# Patient Record
Sex: FEMALE | Race: WHITE | NOT HISPANIC OR LATINO | Employment: PART TIME | URBAN - METROPOLITAN AREA
[De-identification: names, ages, dates, MRNs, and addresses within clinical notes are randomized per-mention and may not be internally consistent; named-entity substitution may affect disease eponyms.]

---

## 2017-11-30 ENCOUNTER — OFFICE VISIT (OUTPATIENT)
Dept: OBSTETRICS AND GYNECOLOGY | Facility: CLINIC | Age: 52
End: 2017-11-30

## 2017-11-30 VITALS
BODY MASS INDEX: 25.78 KG/M2 | WEIGHT: 151 LBS | HEART RATE: 88 BPM | HEIGHT: 64 IN | DIASTOLIC BLOOD PRESSURE: 55 MMHG | SYSTOLIC BLOOD PRESSURE: 94 MMHG

## 2017-11-30 DIAGNOSIS — Z11.3 SCREEN FOR STD (SEXUALLY TRANSMITTED DISEASE): ICD-10-CM

## 2017-11-30 DIAGNOSIS — Z11.4 SCREENING FOR HIV (HUMAN IMMUNODEFICIENCY VIRUS): ICD-10-CM

## 2017-11-30 DIAGNOSIS — N90.89 VULVAR LESION: Primary | ICD-10-CM

## 2017-11-30 DIAGNOSIS — Z12.4 SCREENING FOR CERVICAL CANCER: ICD-10-CM

## 2017-11-30 PROCEDURE — 99203 OFFICE O/P NEW LOW 30 MIN: CPT | Performed by: OBSTETRICS & GYNECOLOGY

## 2017-11-30 NOTE — PROGRESS NOTES
Subjective   Jayda Miles is a 51 y.o. female   CC: Pt here for vaginal lesion and would like std testing.  History of Present Illness  Pt here for vaginal lesion and would like std testing.  Patient reports that she has a remote history of genital warts.  She reported that she had not had any outbreaks for about 15-20 years.  Recently she had noticed some bumps around the opening of the vagina which she thought were warts, and she went into her primary care physician to be evaluated.  She reports that her primary care physician noted genital warts as well as a white lesion at the opening of the vagina that she thought need to be evaluated by a gynecologist.  The patient reports that in September she and her boyfriend had a full STD panel and both of them were negative.  Patient would like to have repeat STD testing again today, because she is concerned that she may have been exposed to an additional STD by her boyfriend.  Patient reports a light menses.  Occasionally she will miss her menstrual period.  She is also having symptoms of hot flashes and night sweats at times.  Patient reports is been several years since she had a Pap smear.  She had a mammogram in over a year ago, and she is aware of the need to have a mammogram done this year.  She has never had a colonoscopy performed.  She is a smoker.      OB History    Para Term  AB Living   2 2 2   2   SAB TAB Ectopic Multiple Live Births             # Outcome Date GA Lbr Rusty/2nd Weight Sex Delivery Anes PTL Lv   2 Term      Vag-Spont      1 Term      Vag-Spont           History reviewed. No pertinent past medical history.     Past Surgical History:   Procedure Laterality Date   • BREAST BIOPSY     • BREAST LUMPECTOMY     • TUBAL ABDOMINAL LIGATION       Family History   Problem Relation Age of Onset   • Cancer Father    • Uterine cancer Mother      Social History   Substance Use Topics   • Smoking status: Current Every Day Smoker   • Smokeless  "tobacco: Never Used   • Alcohol use No     No current outpatient prescriptions on file.     No Known Allergies    Review of Systems  General: No fever or chills  Constitutional: No weight loss or gain, no hair loss  HENT: No headache, no hearing loss, no tinnitus  Eyes: normal vision, no eye pain  Lungs: No cough, no shortness of breath  Heart: No chest pain, no palpitations  Abdomen: No nausea, vomiting, constipation or diarrhea  : No dysuria, no hematuria  Skin: No rashes  Lymph: No swelling  Neuro: No parathesia, no weakness  Psych: Normal though content, no hallucinations, no SI/HI    Objective   Physical Exam  Vitals:    11/30/17 1124   BP: 94/55   Pulse: 88   Weight: 151 lb (68.5 kg)   Height: 64\" (162.6 cm)   Patient's last menstrual period was 11/01/2017 (approximate).   Gen: No acute distress, awake and oriented times three  Abdomen: soft, nontender, non distended, normoactive bowel sounds  Pelvic:   Normal external female genitalia, there is a roughly 2-3 mm white raised lesion at the 5 oclock position at the vaginal introitus. Nontender. No drainage. Possibly leukoplakia. No other condylomatous appearing lesions identified.  Vagina: Moderate frothy white/yellow DC noted  Cervix: No cervical motion tenderness, no lesions, cervix friable, some slightly bleeding after Pap  Bimanual Deferred  Psych: Good judgement and insight, normal affect and mood    Wet prep: Possible trichomonads seen, but specimen difficult to interpret due to excess saline, pos clue cells      Assessment/Plan   Diagnoses and all orders for this visit:    Vulvar lesion  -     HSV 1 & 2 - Specific Antibody, IgG  -     Herpes Simplex Virus (HSV) 1 & 2, JULIA - ThinPrep Vial, Cervix    Screen for STD (sexually transmitted disease)  -     HIV-1 / O / 2 Ag / Antibody 4th Generation  -     Hepatitis B Surface Antigen  -     Hepatitis C Antibody  -     RPR  -     HSV 1 & 2 - Specific Antibody, IgG  -     Chlamydia trachomatis, Neisseria " gonorrhoeae, Trichomonas vaginalis, PCR - Swab, Vagina  -     Herpes Simplex Virus (HSV) 1 & 2, JULIA - ThinPrep Vial, Cervix    Screening for HIV (human immunodeficiency virus)  -     HIV-1 / O / 2 Ag / Antibody 4th Generation    Screening for cervical cancer  -     IGP, Apt HPV,rfx 16 / 18,45 - ThinPrep Vial, Cervix    No significant condyloma noted on exam today.  She does have a small white lesion at 5 o'clock position around the introitus.  This is suspicious and should be biopsied.  Differential would include condylomatous disease, herpetic lesion, a vulvar/vaginal dermatosis, vulvar intraepithelial neoplasia, vulvar malignancy.  Definitive diagnosis will come after the biopsy.  I discussed the importance of following this up with the patient.  She verbalized understanding.  She should return to the office in 1 week to have this biopsy performed.  On exam today, there are some findings suspicious for Trichomonas.  Patient is a frothy yellow discharge and some cervical irritation.  Wet prep was inconclusive for trichomonal disease today.  We will confirm this with cultures which have been sent.  Repeat STD blood work was performed today as well.  I've also obtained HSV PCR from the white lesion on the vulva and ordered HSV antibodies.  We will notify the patient of the results as receive them.  I've instructed the patient to call our office if she has not heard the results after 1 week.  We'll plan to see her back in 1 week as well for vulvar biopsy.    I spent 20 out of 30 minutes with the patient in face to face counseling of the above issues.

## 2017-12-01 LAB
HBV SURFACE AG SERPL QL IA: NEGATIVE
HCV AB S/CO SERPL IA: <0.1 S/CO RATIO (ref 0–0.9)
HIV 1+2 AB+HIV1 P24 AG SERPL QL IA: NON REACTIVE
HSV1 IGG SER IA-ACNC: 49.6 INDEX (ref 0–0.9)
HSV2 IGG SER IA-ACNC: <0.91 INDEX (ref 0–0.9)
RPR SER QL: NORMAL

## 2017-12-03 LAB
HSV1 DNA SPEC QL NAA+PROBE: NEGATIVE
HSV2 DNA SPEC QL NAA+PROBE: NEGATIVE

## 2017-12-04 ENCOUNTER — TELEPHONE (OUTPATIENT)
Dept: OBSTETRICS AND GYNECOLOGY | Facility: CLINIC | Age: 52
End: 2017-12-04

## 2017-12-04 LAB
C TRACH RRNA SPEC QL NAA+PROBE: NEGATIVE
N GONORRHOEA RRNA SPEC QL NAA+PROBE: NEGATIVE
T VAGINALIS RRNA SPEC QL NAA+PROBE: NEGATIVE

## 2017-12-04 NOTE — TELEPHONE ENCOUNTER
----- Message from Talib Paniagua MD sent at 12/1/2017  9:23 AM EST -----  Notify the patient that her STD blood work was normal, but I am still waiting for the Pap smear results, culture results, and herpes culture results.  Her herpes type I antibodies were positive, but this does not likely represent a genital herpes infection.  This is more likely secondary to cold sores/fever blisters, etc.

## 2017-12-05 ENCOUNTER — TELEPHONE (OUTPATIENT)
Dept: OBSTETRICS AND GYNECOLOGY | Facility: CLINIC | Age: 52
End: 2017-12-05

## 2017-12-05 LAB
CYTOLOGIST CVX/VAG CYTO: NORMAL
CYTOLOGY CVX/VAG DOC THIN PREP: NORMAL
DX ICD CODE: NORMAL
HIV 1 & 2 AB SER-IMP: NORMAL
HPV I/H RISK 4 DNA CVX QL PROBE+SIG AMP: NEGATIVE
OTHER STN SPEC: NORMAL
PATH REPORT.FINAL DX SPEC: NORMAL
STAT OF ADQ CVX/VAG CYTO-IMP: NORMAL

## 2017-12-05 NOTE — TELEPHONE ENCOUNTER
----- Message from Talib Paniagua MD sent at 12/4/2017  3:34 PM EST -----  Notify the patient that her vaginal cultures for gonorrhea, chlamydia, and herpes were all negative.

## 2017-12-06 ENCOUNTER — TELEPHONE (OUTPATIENT)
Dept: OBSTETRICS AND GYNECOLOGY | Facility: CLINIC | Age: 52
End: 2017-12-06

## 2017-12-06 NOTE — TELEPHONE ENCOUNTER
----- Message from Talib Paniagua MD sent at 12/5/2017  4:27 PM EST -----  Notify the patient that her Pap was normal

## 2017-12-08 ENCOUNTER — PROCEDURE VISIT (OUTPATIENT)
Dept: OBSTETRICS AND GYNECOLOGY | Facility: CLINIC | Age: 52
End: 2017-12-08

## 2017-12-08 VITALS
BODY MASS INDEX: 25.44 KG/M2 | DIASTOLIC BLOOD PRESSURE: 72 MMHG | SYSTOLIC BLOOD PRESSURE: 116 MMHG | HEIGHT: 64 IN | WEIGHT: 149 LBS | HEART RATE: 89 BPM

## 2017-12-08 DIAGNOSIS — N90.89 VULVAR LESION: Primary | ICD-10-CM

## 2017-12-08 PROCEDURE — 56605 BIOPSY OF VULVA/PERINEUM: CPT | Performed by: OBSTETRICS & GYNECOLOGY

## 2017-12-08 NOTE — PROGRESS NOTES
Procedure   Procedures   pt here for vulvar bx.      Procedure: Vulvar biopsy    Indications: Vulvar lesion    Findings: About a 3 mm rounded lesion at the 5 o'clock position just inside the left labium minora, the lesion is a white plaquing-appearing lesion.  No other lesions identified during a thorough external vaginal/vulvar/perineal exam.  Patient is very concerned about potential condylomatous lesions.  Aside from the lesion noted, I do not see any other signs of condylomatous disease.  The area that I feel she is most concerned about appears to just be the lower aspect of her labium minora, which appears normal.    Pathology: Left lower biopsy    Estimated blood loss: Minimal    Procedure in detail: The patient was counseled about the indications for the procedure.  The risks, benefits, and alternatives were discussed with the patient at length including the risks of bleeding, infection, pain, wound breakdown.  She verbalized understanding and gave verbal consent to proceed.  Betadine was applied to the area to be biopsied.  About 2 cc of 1% lidocaine with epinephrine was injected just underneath the skin.  A 3.5 mm punch biopsy was obtained from the right labium minora and the area of the most abnormal appearance.  The disc was then elevated with forceps and excised.  This was sent to pathology.  Pressure was held with sterile gauze and silver nitrate was applied to the biopsy site.  Excellent hemostasis was noted.  There were no complications.  The patient tolerated the procedure well.  Wound care instructions were given to the patient.  We will notify the patient of the results of the biopsy.  The patient is instructed to call our office in 2 weeks if she has not heard any results.  She verbalized understanding.

## 2017-12-14 ENCOUNTER — TELEPHONE (OUTPATIENT)
Dept: OBSTETRICS AND GYNECOLOGY | Facility: CLINIC | Age: 52
End: 2017-12-14

## 2017-12-14 LAB
DX ICD CODE: NORMAL
DX ICD CODE: NORMAL
PATH REPORT.FINAL DX SPEC: NORMAL
PATH REPORT.GROSS SPEC: NORMAL
PATH REPORT.SITE OF ORIGIN SPEC: NORMAL
PATHOLOGIST NAME: NORMAL
PAYMENT PROCEDURE: NORMAL

## 2017-12-14 NOTE — TELEPHONE ENCOUNTER
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  This is likely related to HPV/condyloma, but this could be something that we will need to watch over time to be sure it has not progressing to more advanced or even precancerous type of lesion.  Typically, just performing the biopsy in the office we'll remove the abnormal area.  I would want to see her back in about 3 months for repeat evaluation of the vulva to be sure that we do not see any other abnormal areas or that this area is not coming back.

## 2018-03-16 ENCOUNTER — OFFICE VISIT (OUTPATIENT)
Dept: OBSTETRICS AND GYNECOLOGY | Facility: CLINIC | Age: 53
End: 2018-03-16

## 2018-03-16 VITALS
SYSTOLIC BLOOD PRESSURE: 97 MMHG | BODY MASS INDEX: 25.78 KG/M2 | HEART RATE: 80 BPM | DIASTOLIC BLOOD PRESSURE: 60 MMHG | HEIGHT: 64 IN | WEIGHT: 151 LBS

## 2018-03-16 DIAGNOSIS — N90.89 VULVAR LESION: Primary | ICD-10-CM

## 2018-03-16 DIAGNOSIS — N90.1 VULVAR INTRAEPITHELIAL NEOPLASIA (VIN) GRADE 2: ICD-10-CM

## 2018-03-16 PROCEDURE — 99213 OFFICE O/P EST LOW 20 MIN: CPT | Performed by: OBSTETRICS & GYNECOLOGY

## 2018-03-16 PROCEDURE — 56605 BIOPSY OF VULVA/PERINEUM: CPT | Performed by: OBSTETRICS & GYNECOLOGY

## 2018-03-16 NOTE — PROGRESS NOTES
Procedure: Excision of vulvar lesion    Indications:   1.  History of HAKEEM-2  2.  External vulvar lesion    Findings: Patient has a history of HAKEEM 2, based on biopsy about 4 months ago.  On exam today, she again has about a 3 mm raised warty-appearing lesion about the 5 o'clock position just inside the left labium minora.    Pathology: Vulvar lesion    Estimated blood loss: Minimal    Procedure in detail: The patient was counseled about the indications for the procedure.  The risks, benefits, and alternatives were discussed with the patient at length including the risks of bleeding, infection, pain, wound breakdown.  She verbalized understanding and gave verbal consent to proceed.  Topical lidocaine gel was applied to the area.  Betadine was applied to the area to be biopsied.  About 2 cc of 1% lidocaine with epinephrine was injected just underneath the skin.  The lesion was grasped and elevated with pickups, and the lesion was excised with about a 3 mm margin circumferentially using an 11 blade scalpel.  This was sent to pathology.  Pressure was held with sterile gauze and silver nitrate was applied to the biopsy site.  A single figure-of-eight stitch of 3-0 Monocryl was applied. Excellent hemostasis was noted.  There were no complications.  The patient tolerated the procedure well.  Wound care instructions were given to the patient.  We will notify the patient of the results of the biopsy.  The patient is instructed to call our office in 2 weeks if she has not heard any results.  She verbalized understanding.

## 2018-03-16 NOTE — PROGRESS NOTES
"Jax iMles is a 52 y.o. female.   CC: Pt here for f/u for HAKEEM 2.  History of Present Illness   Pt here for f/u for HAKEEM 2.  Patient first presented to me in November 2017 with a lump at about 5 o'clock position at the vaginal introitus/vulva.  Biopsy was performed of this area which showed HAKEEM 2.  I recommended that the patient return for visual inspection of the vulva to follow for this history of RENETTA-2.  Today, the patient is in her usual state of health.  She denies any noticeable vaginal or vulvar bumps or lesions.    The following portions of the patient's history were reviewed and updated as appropriate: allergies, current medications, past family history, past medical history, past social history, past surgical history and problem list.    Review of Systems  General: No fever or chills  Constitutional: No weight loss or gain, no hair loss  HENT: No headache, no hearing loss, no tinnitus  Eyes: normal vision, no eye pain  Lungs: No cough, no shortness of breath  Heart: No chest pain, no palpitations  Abdomen: No nausea, vomiting, constipation or diarrhea  : No dysuria, no hematuria  Skin: No rashes  Lymph: No swelling  Neuro: No parathesia, no weakness  Psych: Normal though content, no hallucinations, no SI/HI    Objective   Physical Exam  Vitals:    03/16/18 1059   BP: 97/60   Pulse: 80   Weight: 68.5 kg (151 lb)   Height: 162.6 cm (64\")   Gen: No acute distress, awake and oriented times three  Abdomen: soft, nontender, non distended, normoactive bowel sounds  Pelvic: Exam performed in the presence of a female chaperone  Patient has provided verbal consent to proceed with exam.  Normal external female genitalia, There is again about a 3 mm raised, white, warty-appearing lesion again just inside the labia minora at about the 5 o'clock position.  This is similar in appearance to the previous area seen in November 2017.  Upon complete visual inspection of the external vagina and vulva, there " are no other abnormalities identified.  Bimanual: Deferred  Psych: Good judgement and insight, normal affect and mood      Assessment/Plan   Diagnoses and all orders for this visit:    Vulvar lesion    Vulvar intraepithelial neoplasia (HAKEEM) grade 2  -     Biopsy Vulva  -     Reference Histopathology      There is again the lesion about the 5 o'clock position of the raised warty appearance.  Given her history of HAKEEM 2, I would recommend excision of this lesion.  No other abnormalities are identified.  We will proceed with excision of the vulvar lesion in the office today.  Please see procedure note for details.  I explained the need for close follow-up of this condition with the patient.  I would like to see her every 6 months for screening colposcopy of the vulva.    I spent 10 out of 15 minutes with the patient in face to face counseling of the above issues.

## 2018-03-22 ENCOUNTER — TELEPHONE (OUTPATIENT)
Dept: OBSTETRICS AND GYNECOLOGY | Facility: CLINIC | Age: 53
End: 2018-03-22

## 2018-03-22 NOTE — TELEPHONE ENCOUNTER
----- Message from Talib Paniagua MD sent at 3/22/2018  1:44 PM EDT -----  Let the patient know that her biopsy from last week again showed vulvar dysplasia.  We will just need to keep a close eye on this every 6 months.

## 2018-09-25 ENCOUNTER — PROCEDURE VISIT (OUTPATIENT)
Dept: OBSTETRICS AND GYNECOLOGY | Facility: CLINIC | Age: 53
End: 2018-09-25

## 2018-09-25 VITALS
SYSTOLIC BLOOD PRESSURE: 116 MMHG | WEIGHT: 142 LBS | HEIGHT: 64 IN | BODY MASS INDEX: 24.24 KG/M2 | HEART RATE: 74 BPM | DIASTOLIC BLOOD PRESSURE: 70 MMHG

## 2018-09-25 DIAGNOSIS — Z32.02 NEGATIVE PREGNANCY TEST: ICD-10-CM

## 2018-09-25 DIAGNOSIS — N90.89 VULVAR LESION: ICD-10-CM

## 2018-09-25 DIAGNOSIS — N90.1 VULVAR INTRAEPITHELIAL NEOPLASIA (VIN) GRADE 2: Primary | ICD-10-CM

## 2018-09-25 LAB
B-HCG UR QL: NEGATIVE
INTERNAL NEGATIVE CONTROL: NEGATIVE
INTERNAL POSITIVE CONTROL: POSITIVE
Lab: NORMAL

## 2018-09-25 PROCEDURE — 81025 URINE PREGNANCY TEST: CPT | Performed by: OBSTETRICS & GYNECOLOGY

## 2018-09-25 PROCEDURE — 56821 COLPOSCOPY VULVA W/BIOPSY: CPT | Performed by: OBSTETRICS & GYNECOLOGY

## 2018-09-25 NOTE — PROGRESS NOTES
Procedure   Procedures   pt here for Colposcopy of the vulva.    Procedure: Colposcopy of the vulva  Preoperative diagnosis: History of high-grade HAKEEM  2.  Vulvar lesion  Postoperative diagnosis: Same  Indications: Patient has a history of biopsy-proven high-grade HAKEEM.  Most recent biopsy was March 2018.  She is here today for screening colposcopy of the vulva.  She does have a history of laser therapy for warts in the remote past.  Most recent Pap smear was less than 1 year ago and was normal.  Findings: See below  Anesthesia: 1% lidocaine with epinephrine  Pathology: Biopsy of the right vulva at 11:00, biopsy of the left labium minora at about the 5 o'clock position  Estimated blood loss: Minimal, less than 5 mL  Procedure in detail: The patient was counseled about the indications for the Colposcopy.  The risks, benefits, and alternatives were discussed with the patient, and she provided verbal consent proceed.  The patient was placed in the dorsal lithotomy position in stirrups.  A dilute solution of 3% acetic acid was applied liberally over the external surface of the vulva, labia minora, introitus, and perineal body.  I waited about 5 minutes and then returned.  Patient had grossly normal findings prior to application of acetic acid.  With magnification with the colposcope, there was about a 5 mm round acetowhite lesion at the 5 o'clock position just inside the labium minora along the posterior fourchette.  With in this area there was some hyperpigmentation.  This is in a similar area to the previous biopsy-proven high-grade dysplasia.  Findings at this area likely consistent with recurrent high-grade vulvar dysplasia.  There was an additional area of concern in the fold between the labia minora and labia majora on the right side.  There was about a 2 cm long by 5 mm wide area of hypopigmentation.  Within this was a very small 2 mm pinpoint hyperpigmented lesion.  I have recommended biopsies at both sites.  I  discussed with the patient the possibility of trying to perform complete excision of the lesion at the 5 o'clock position that is previously been at least high-grade vulvar dysplasia.  My concern is that in order to get adequate borders, we will may need to take more tissue and I would feel comfortable with doing an office based biopsy.  I have recommended either laser ablation of this area or surgical excision in the operating room.  The patient is interested in proceeding with laser ablation.  As we will be ablating this area, I think it is important to perform another biopsy today to exclude malignant change.  The patient agrees with the plan.  About a half cc of 1% lidocaine with epinephrine was injected just under the skin in both areas to be biopsied.  In both areas, a 3 mm punch biopsy was performed, the disc was elevated with forceps and excised with scissors.  These were both sent to pathology in formalin.  Pressure was held and silver nitrate was applied.  Excellent hemostasis was noted.  There were no major complications.  I will have the patient return to the office in 2 weeks for pre-op for likely laser ablation of the vulva.  The patient has previously undergone laser ablation of the vulva for condyloma.  She is well informed about the procedure and typical recovery process.  She is discharged home today in stable condition.

## 2018-10-09 ENCOUNTER — OFFICE VISIT (OUTPATIENT)
Dept: OBSTETRICS AND GYNECOLOGY | Facility: CLINIC | Age: 53
End: 2018-10-09

## 2018-10-09 VITALS — HEART RATE: 69 BPM | HEIGHT: 64 IN | WEIGHT: 138 LBS | BODY MASS INDEX: 23.56 KG/M2

## 2018-10-09 DIAGNOSIS — N90.1 VULVAR INTRAEPITHELIAL NEOPLASIA (VIN) GRADE 2: Primary | ICD-10-CM

## 2018-10-09 DIAGNOSIS — N90.0 VULVAR INTRAEPITHELIAL NEOPLASIA (VIN) GRADE 1: ICD-10-CM

## 2018-10-09 PROCEDURE — 99213 OFFICE O/P EST LOW 20 MIN: CPT | Performed by: OBSTETRICS & GYNECOLOGY

## 2018-10-09 RX ORDER — IMIQUIMOD 12.5 MG/.25G
CREAM TOPICAL 3 TIMES WEEKLY
Qty: 12 EACH | Refills: 3 | Status: SHIPPED | OUTPATIENT
Start: 2018-10-10 | End: 2018-11-07

## 2018-10-09 NOTE — PROGRESS NOTES
"Subjective   Jayda Miles is a 52 y.o. female.   CC: Pt here for f/u vulvar biopsies  History of Present Illness   Pt here for f/u vulvar biopsies.  Patient has a history of high-grade vulvar dysplasia, and she has been undergoing serial vulvar colposcopy every 6 months.  She had a biopsy 2 weeks ago in the office.  Biopsy at that time showed low-grade vulvar dysplasia at both biopsy sites.  Previously, and felt the patient may require laser ablation of these areas, but now we can rethink that based on recent biopsy findings.  She is otherwise in her usual state of health today.    No current outpatient prescriptions on file prior to visit.     No current facility-administered medications on file prior to visit.        No Known Allergies  The following portions of the patient's history were reviewed and updated as appropriate: allergies, current medications, past family history, past medical history, past social history, past surgical history and problem list.    Review of Systems  General: No fever or chills  Abdomen: No nausea, vomiting, constipation or diarrhea  : No dysuria, no hematuria  Psych: Normal though content, no hallucinations, no SI/HI    Objective   Physical Exam  Vitals:    10/09/18 1039   Pulse: 69   Weight: 62.6 kg (138 lb)   Height: 162.6 cm (64\")     Gen.: No acute distress, awake and oriented ×3  Psychiatric: Good judgment and insight, normal affect and mood  Neurologic: Cranial nerves II through XII intact, no gross deficits    Assessment/Plan   Diagnoses and all orders for this visit:    Vulvar intraepithelial neoplasia (HAKEEM) grade 2 - resolved    Vulvar intraepithelial neoplasia (HAKEEM) grade 1  -     imiquimod (ALDARA) 5 % cream; Apply  topically to the appropriate area as directed 3 (Three) Times a Week for 28 days.    Recent vulvar biopsies reveal only HAKEEM 1.  Given these recent findings, I believe conservative management with close follow-up every 6 months is warranted.  I also " discussed with the patient possible use of imiquimod off label to help treat condylomatous change/low-grade vulvar dysplasia.  The risks, benefits, and side effects were discussed.  I stressed with the patient that this is all for label use.  We discussed common side effects such as irritation/atrophy, etc.  Instructions for use were given.  Patient would like to start this treatment at this time.  We will try a 1 month course of treatment with applications 3 times per week for 4 weeks.  I will see her back after this time to see how this is doing.  If this is effective and the patient tolerates it well, may consider usage of this for a total of 4 months.  She will still need screening colposcopy of the vulva every 6 months.    I spent 14 out of 15 minutes with the patient in face to face counseling of the above issues.

## 2018-11-06 ENCOUNTER — OFFICE VISIT (OUTPATIENT)
Dept: OBSTETRICS AND GYNECOLOGY | Facility: CLINIC | Age: 53
End: 2018-11-06

## 2018-11-06 VITALS
DIASTOLIC BLOOD PRESSURE: 76 MMHG | HEART RATE: 89 BPM | WEIGHT: 138 LBS | HEIGHT: 64 IN | SYSTOLIC BLOOD PRESSURE: 119 MMHG | BODY MASS INDEX: 23.56 KG/M2

## 2018-11-06 DIAGNOSIS — N90.1 VULVAR INTRAEPITHELIAL NEOPLASIA (VIN) GRADE 2: Primary | ICD-10-CM

## 2018-11-06 DIAGNOSIS — N90.0 VULVAR INTRAEPITHELIAL NEOPLASIA (VIN) GRADE 1: ICD-10-CM

## 2018-11-06 PROCEDURE — 99212 OFFICE O/P EST SF 10 MIN: CPT | Performed by: OBSTETRICS & GYNECOLOGY

## 2018-11-06 NOTE — PROGRESS NOTES
"Jax Miles is a 52 y.o. female.   CC: pt here for f/u vulvar dysplasia    History of Present Illness   Patient has been undergoing serial colposcopy of the vulva with biopsies every 6 months secondary to history of HAKEEM 2.  Most recent biopsy in September actually revealed HAKEEM-I.  At that time, we discussed use of imiquimod cream to try to help treat potential reverse these dysplastic effects.  Patient has used this cream for 1 month.  She reports some minor irritation on the day after treatment, but states she is generally tolerated it well.  She is completed one month of therapy.      Current Outpatient Prescriptions:   •  imiquimod (ALDARA) 5 % cream, Apply  topically to the appropriate area as directed 3 (Three) Times a Week for 28 days., Disp: 12 each, Rfl: 3     No Known Allergies  The following portions of the patient's history were reviewed and updated as appropriate: allergies, current medications, past family history, past medical history, past social history, past surgical history and problem list.    Review of Systems  General: No fever or chills  : No dysuria, no hematuria  Psych: Normal though content, no hallucinations, no SI/HI    Objective   Physical Exam  Vitals:    11/06/18 1120   BP: 119/76   Pulse: 89   Weight: 62.6 kg (138 lb)   Height: 162.6 cm (64\")     Gen.: No acute distress, awake and oriented ×3  Psychiatric: Good judgment and insight, normal affect and mood    Assessment/Plan   Diagnoses and all orders for this visit:    Vulvar intraepithelial neoplasia (HAKEEM) grade 2    Vulvar intraepithelial neoplasia (HAKEEM) grade 1     patient has tolerated the imiquimod cream well.  I would recommend that she continue for a total 4 month course.  We will plan repeat colposcopy of the vulva with biopsies if indicated in March 2019.  I again reviewed the instructions of topical use of imiquimod cream with the patient.           "

## 2019-03-12 ENCOUNTER — PROCEDURE VISIT (OUTPATIENT)
Dept: OBSTETRICS AND GYNECOLOGY | Facility: CLINIC | Age: 54
End: 2019-03-12

## 2019-03-12 VITALS
DIASTOLIC BLOOD PRESSURE: 80 MMHG | HEART RATE: 84 BPM | SYSTOLIC BLOOD PRESSURE: 116 MMHG | HEIGHT: 64 IN | BODY MASS INDEX: 23.56 KG/M2 | WEIGHT: 138 LBS

## 2019-03-12 DIAGNOSIS — N90.0 VULVAR INTRAEPITHELIAL NEOPLASIA (VIN) GRADE 1: Primary | ICD-10-CM

## 2019-03-12 PROCEDURE — 56820 COLPOSCOPY VULVA: CPT | Performed by: OBSTETRICS & GYNECOLOGY

## 2019-03-12 NOTE — PROGRESS NOTES
Procedure   Procedures   CC: pt here for colpo.    Procedure: Colposcopy of the vulva  Preoperative diagnosis: History of HAKEEM 2 and HAKEEM 1  Postoperative diagnosis: Same  Indications: Patient had a history of abnormal vulvar lesion on the left and right in the past.  Initial biopsies revealed HAKEEM 2.  That was performed in March 2018.  She subsequently had screening colposcopy and repeat biopsy in September 2018 which showed HAKEEM 1 of the right vulva and just inside the left labia.  Patient was treated with 4 months of imiquimod.  She returns today for interval 6-month screening colposcopy of the vulva.  Last Pap smear was November 2017 and was negative, high risk HPV negative.  She denies any vulvar symptoms at this time.  She denies itching, irritation, etc.  Findings: See below  Anesthesia: None  Pathology: None  Estimated blood loss: None  Procedure in detail: The patient was counseled extensively about her previous biopsy results.  She was counseled about the indications for the Colposcopy.  The risks, benefits, and alternatives were discussed with the patient, and she provided verbal consent proceed.  The patient was placed in the dorsal lithotomy position in stirrups.  A dilute solution of 3% acetic acid mixed with normal saline was applied liberally over the vaginal introitus, labia minora, and labia majora bilaterally.  This was allowed to sit for about 5 minutes.  Gross inspection of the vagina and vulva was normal without any obvious lesions.  The vulva was then thoroughly evaluated with the colposcope under white light and green light.  Of note, the previous abnormal area at about the 4 o'clock position was normal today with no acetowhite changes or neovascularization.  No other abnormal areas were noted on colposcopic evaluation.  No biopsies were performed.  Patient tolerated this procedure well all her questions are answered today.  The patient was discharged home in stable condition.   I have  recommended repeat screening colposcopy of the vulva in 6 months.  Discussed the need for close follow-up.  I explained to the patient that I will be leaving the practice in June of this year.  I will have her follow-up with 1 of my partners.  She verbalizes understanding.

## 2019-09-12 ENCOUNTER — TELEPHONE (OUTPATIENT)
Dept: OBSTETRICS AND GYNECOLOGY | Facility: CLINIC | Age: 54
End: 2019-09-12

## 2019-10-14 ENCOUNTER — OFFICE VISIT (OUTPATIENT)
Dept: OBSTETRICS AND GYNECOLOGY | Facility: CLINIC | Age: 54
End: 2019-10-14

## 2019-10-14 VITALS
WEIGHT: 139 LBS | SYSTOLIC BLOOD PRESSURE: 102 MMHG | DIASTOLIC BLOOD PRESSURE: 73 MMHG | HEART RATE: 72 BPM | BODY MASS INDEX: 23.73 KG/M2 | HEIGHT: 64 IN

## 2019-10-14 DIAGNOSIS — N90.0 VULVAR INTRAEPITHELIAL NEOPLASIA (VIN) GRADE 1: Primary | ICD-10-CM

## 2019-10-14 PROCEDURE — 56820 COLPOSCOPY VULVA: CPT | Performed by: OBSTETRICS & GYNECOLOGY

## 2019-10-14 NOTE — PROGRESS NOTES
Colposcopy Procedure Note    Indications: Follow up of HAKEEM I and HAKEEM II   Prior cervical/vaginal disease: HAKEEM 1 and HAKEEM 2.  Prior cervical treatment: Imiquimod x 6 months     Procedure Details   The risks and benefits of the procedure and Verbal informed consent obtained.    Vulva soaked with dilute solution of acetic acid. After 2 minutes colposcopic inspection begun     Findings:  Cervix: No cervical colposcopy   Vaginal inspection: vaginal colposcopy not performed.  Vulvar colposcopy: acetic acid applied o vulvar tissue and normal mucosa without lesions.    Physical Exam   Genitourinary:             Specimens: None     Procedure tolerated: tolerated well.     Complications: none.    Plan:  Repeat exam in 6 months with annual check up     Davi Laguerre MD  10/14/2019  12:46 PM

## 2022-12-12 ENCOUNTER — OFFICE VISIT (OUTPATIENT)
Dept: OBSTETRICS AND GYNECOLOGY | Facility: CLINIC | Age: 57
End: 2022-12-12

## 2022-12-12 VITALS
HEIGHT: 63 IN | SYSTOLIC BLOOD PRESSURE: 114 MMHG | DIASTOLIC BLOOD PRESSURE: 76 MMHG | BODY MASS INDEX: 25.69 KG/M2 | WEIGHT: 145 LBS

## 2022-12-12 DIAGNOSIS — Z72.0 TOBACCO USE: ICD-10-CM

## 2022-12-12 DIAGNOSIS — Z12.11 COLON CANCER SCREENING: ICD-10-CM

## 2022-12-12 DIAGNOSIS — N90.1 VULVAR INTRAEPITHELIAL NEOPLASIA (VIN) GRADE 2: ICD-10-CM

## 2022-12-12 DIAGNOSIS — Z01.419 VISIT FOR GYNECOLOGIC EXAMINATION: Primary | ICD-10-CM

## 2022-12-12 LAB
DEVELOPER EXPIRATION DATE: NORMAL
DEVELOPER LOT NUMBER: NORMAL
EXPIRATION DATE: NORMAL
FECAL OCCULT BLOOD SCREEN, POC: NEGATIVE
Lab: NORMAL
NEGATIVE CONTROL: NEGATIVE
POSITIVE CONTROL: POSITIVE

## 2022-12-12 PROCEDURE — 3008F BODY MASS INDEX DOCD: CPT | Performed by: OBSTETRICS & GYNECOLOGY

## 2022-12-12 PROCEDURE — 99386 PREV VISIT NEW AGE 40-64: CPT | Performed by: OBSTETRICS & GYNECOLOGY

## 2022-12-12 PROCEDURE — 2014F MENTAL STATUS ASSESS: CPT | Performed by: OBSTETRICS & GYNECOLOGY

## 2022-12-12 PROCEDURE — 82274 ASSAY TEST FOR BLOOD FECAL: CPT | Performed by: OBSTETRICS & GYNECOLOGY

## 2022-12-12 NOTE — PATIENT INSTRUCTIONS
Steps to Quit Smoking  Smoking tobacco is the leading cause of preventable death. It can affect almost every organ in the body. Smoking puts you and those around you at risk for developing many serious chronic diseases. Quitting smoking can be difficult, but it is one of the best things that you can do for your health. It is never too late to quit.  How do I get ready to quit?  When you decide to quit smoking, create a plan to help you succeed. Before you quit:  • Pick a date to quit. Set a date within the next 2 weeks to give you time to prepare.  • Write down the reasons why you are quitting. Keep this list in places where you will see it often.  • Tell your family, friends, and co-workers that you are quitting. Support from your loved ones can make quitting easier.  • Talk with your health care provider about your options for quitting smoking.  • Find out what treatment options are covered by your health insurance.  • Identify people, places, things, and activities that make you want to smoke (triggers). Avoid them.  What first steps can I take to quit smoking?  • Throw away all cigarettes at home, at work, and in your car.  • Throw away smoking accessories, such as ashtrays and lighters.  • Clean your car. Make sure to empty the ashtray.  • Clean your home, including curtains and carpets.  What strategies can I use to quit smoking?  Talk with your health care provider about combining strategies, such as taking medicines while you are also receiving in-person counseling. Using these two strategies together makes you more likely to succeed in quitting than if you used either strategy on its own.  • If you are pregnant or breastfeeding, talk with your health care provider about finding counseling or other support strategies to quit smoking. Do not take medicine to help you quit smoking unless your health care provider tells you to do so.  To quit smoking:  Quit right away  • Quit smoking completely, instead of  gradually reducing how much you smoke over a period of time. Research shows that stopping smoking right away is more successful than gradually quitting.  • Attend in-person counseling to help you build problem-solving skills. You are more likely to succeed in quitting if you attend counseling sessions regularly. Even short sessions of 10 minutes can be effective.  Take medicine  You may take medicines to help you quit smoking. Some medicines require a prescription and some you can purchase over-the-counter. Medicines may have nicotine in them to replace the nicotine in cigarettes. Medicines may:  • Help to stop cravings.  • Help to relieve withdrawal symptoms.  Your health care provider may recommend:  • Nicotine patches, gum, or lozenges.  • Nicotine inhalers or sprays.  • Non-nicotine medicine that is taken by mouth.  Find resources  Find resources and support systems that can help you to quit smoking and remain smoke-free after you quit. These resources are most helpful when you use them often. They include:  • Online chats with a counselor.  • Telephone quitlines.  • Printed self-help materials.  • Support groups or group counseling.  • Text messaging programs.  • Mobile phone apps or applications. Use apps that can help you stick to your quit plan by providing reminders, tips, and encouragement. There are many free apps for mobile devices as well as websites. Examples include Quit Guide from the CDC and smokefree.gov  What things can I do to make it easier to quit?    • Reach out to your family and friends for support and encouragement. Call telephone quitlines (5-341-QUIT-NOW), reach out to support groups, or work with a counselor for support.  • Ask people who smoke to avoid smoking around you.  • Avoid places that trigger you to smoke, such as bars, parties, or smoke-break areas at work.  • Spend time with people who do not smoke.  • Lessen the stress in your life. Stress can be a smoking trigger for some  people. To lessen stress, try:  ? Exercising regularly.  ? Doing deep-breathing exercises.  ? Doing yoga.  ? Meditating.  ? Performing a body scan. This involves closing your eyes, scanning your body from head to toe, and noticing which parts of your body are particularly tense. Try to relax the muscles in those areas.  How will I feel when I quit smoking?  Day 1 to 3 weeks  Within the first 24 hours of quitting smoking, you may start to feel withdrawal symptoms. These symptoms are usually most noticeable 2-3 days after quitting, but they usually do not last for more than 2-3 weeks. You may experience these symptoms:  • Mood swings.  • Restlessness, anxiety, or irritability.  • Trouble concentrating.  • Dizziness.  • Strong cravings for sugary foods and nicotine.  • Mild weight gain.  • Constipation.  • Nausea.  • Coughing or a sore throat.  • Changes in how the medicines that you take for unrelated issues work in your body.  • Depression.  • Trouble sleeping (insomnia).  Week 3 and afterward  After the first 2-3 weeks of quitting, you may start to notice more positive results, such as:  • Improved sense of smell and taste.  • Decreased coughing and sore throat.  • Slower heart rate.  • Lower blood pressure.  • Clearer skin.  • The ability to breathe more easily.  • Fewer sick days.  Quitting smoking can be very challenging. Do not get discouraged if you are not successful the first time. Some people need to make many attempts to quit before they achieve long-term success. Do your best to stick to your quit plan, and talk with your health care provider if you have any questions or concerns.  Summary  • Smoking tobacco is the leading cause of preventable death. Quitting smoking is one of the best things that you can do for your health.  • When you decide to quit smoking, create a plan to help you succeed.  • Quit smoking right away, not slowly over a period of time.  • When you start quitting, seek help from your  health care provider, family, or friends.  This information is not intended to replace advice given to you by your health care provider. Make sure you discuss any questions you have with your health care provider.  Document Revised: 08/26/2022 Document Reviewed: 03/07/2020  Elsevier Patient Education © 2022 [employer]

## 2022-12-12 NOTE — PROGRESS NOTES
"[facility]  [address], [address]  [place], [place] [address]  Phone: [phone] / Fax:  [phone]      2022    [id number] [place] [address]    Flora Garvin MD    Chief Complaint   Patient presents with   • Gynecologic Exam     NP Annual Exam, last pap 17 NL HPV (-). Mammogram 22 NL.. Patient has never had a Colonoscopy.       Jayda Miles is here for annual gynecologic exam.  HPI - Patient with last normal pap 5 years ago.  She is menopausal and does not have periods.  She had a normal mammogram 9 months ago.      Past Medical History:   Diagnosis Date   • Vulvar intraepithelial neoplasia (HAKEEM) grade 2        Past Surgical History:   Procedure Laterality Date   • BREAST BIOPSY Left    • BREAST LUMPECTOMY Left    • TUBAL ABDOMINAL LIGATION         No Known Allergies    Social History     Socioeconomic History   • Marital status: Single   Tobacco Use   • Smoking status: Every Day     Packs/day: 1.00     Types: Cigarettes   • Smokeless tobacco: Never   Vaping Use   • Vaping Use: Never used   Substance and Sexual Activity   • Alcohol use: No   • Drug use: No   • Sexual activity: Not Currently     Birth control/protection: Tubal ligation       Family History   Problem Relation Age of Onset   • Cancer Father    • Uterine cancer Mother    • Cancer Maternal Grandfather    • Breast cancer Neg Hx    • Colon cancer Neg Hx        Patient's last menstrual period was 2018 (approximate).    OB History        2    Para   2    Term   2            AB        Living   2       SAB        IAB        Ectopic        Molar        Multiple        Live Births                    Vitals:    22 1247   BP: 114/76   Weight: 65.8 kg (145 lb)   Height: 160 cm (63\")       Physical Exam  Constitutional:       Appearance: Normal appearance. She is well-developed.   Genitourinary:      Vagina, uterus, rectum and urethral meatus normal.      Right Labia: No tenderness " or lesions.     Left Labia: No tenderness or lesions.     No vaginal discharge or tenderness.        Right Adnexa: not tender and not full.     Left Adnexa: not tender and not full.     No cervical motion tenderness or lesion.      Uterus is not enlarged or tender.      No urethral tenderness present.      Pelvic exam was performed with patient supine.   Rectum:      No rectal mass or tenderness.   Breasts:     Right: No mass or nipple discharge.      Left: No mass or nipple discharge.   HENT:      Right Ear: External ear normal.      Left Ear: External ear normal.      Nose: Nose normal.   Eyes:      Conjunctiva/sclera: Conjunctivae normal.   Neck:      Thyroid: No thyromegaly.   Cardiovascular:      Rate and Rhythm: Normal rate and regular rhythm.      Heart sounds: Normal heart sounds.   Pulmonary:      Effort: Pulmonary effort is normal.      Breath sounds: No stridor. No wheezing.   Abdominal:      Palpations: Abdomen is soft. There is no mass.      Tenderness: There is no guarding or rebound.   Musculoskeletal:         General: Normal range of motion.      Cervical back: Normal range of motion and neck supple.   Neurological:      Mental Status: She is alert.      Coordination: Coordination normal.   Skin:     General: Skin is warm and dry.   Psychiatric:         Mood and Affect: Mood normal.         Behavior: Behavior normal.         Thought Content: Thought content normal.         Judgment: Judgment normal.   Vitals reviewed. Exam conducted with a chaperone present.         Diagnoses and all orders for this visit:    1. Visit for gynecologic examination (Primary)  -     IgP, Aptima HPV  -     Discussed importance of regular screening and breast awareness.    2. Colon cancer screening        -     FOBT negative.  Patient refuses further screening.  Rationale explained.    3. Vulvar intraepithelial neoplasia (HAKEEM) grade 2        -     Vulva normal appearing.  Continue surveillance.    4. Tobacco use        -       Advised to quit smoking and discussed risks of continued smoking.      Jacques Worley MD

## 2022-12-19 LAB
CYTOLOGIST CVX/VAG CYTO: NORMAL
CYTOLOGY CVX/VAG DOC CYTO: NORMAL
CYTOLOGY CVX/VAG DOC THIN PREP: NORMAL
DX ICD CODE: NORMAL
HIV 1 & 2 AB SER-IMP: NORMAL
HPV I/H RISK 4 DNA CVX QL PROBE+SIG AMP: NEGATIVE
OTHER STN SPEC: NORMAL
STAT OF ADQ CVX/VAG CYTO-IMP: NORMAL

## 2022-12-29 ENCOUNTER — TELEPHONE (OUTPATIENT)
Dept: OBSTETRICS AND GYNECOLOGY | Facility: CLINIC | Age: 57
End: 2022-12-29

## 2023-06-13 ENCOUNTER — OFFICE VISIT (OUTPATIENT)
Dept: OBSTETRICS AND GYNECOLOGY | Facility: CLINIC | Age: 58
End: 2023-06-13
Payer: COMMERCIAL

## 2023-06-13 VITALS
WEIGHT: 144.6 LBS | HEART RATE: 68 BPM | SYSTOLIC BLOOD PRESSURE: 112 MMHG | HEIGHT: 63 IN | DIASTOLIC BLOOD PRESSURE: 73 MMHG | BODY MASS INDEX: 25.62 KG/M2

## 2023-06-13 DIAGNOSIS — N90.89 VULVAR LESION: ICD-10-CM

## 2023-06-13 DIAGNOSIS — N90.1 VULVAR INTRAEPITHELIAL NEOPLASIA (VIN) GRADE 2: Primary | ICD-10-CM

## 2023-06-13 NOTE — PROGRESS NOTES
CC: pt here for colpo.     Procedure: Colposcopy of the vulva  Preoperative diagnosis: History of HAKEEM 2  Postoperative diagnosis: Same  Indications: Patient had a history of abnormal vulvar lesion on the left and right in the past.  Initial biopsies revealed HAKEEM 2.  That was performed in March 2018.  She subsequently had screening colposcopy and repeat biopsy in September 2018 which showed HAKEEM 1 of the right vulva and just inside the left labia.  Patient was treated with 4 months of imiquimod.  Most recent colposcopy of the vulva was in 2019.  She returns today for interval screening colposcopy of the vulva.  Last Pap smear was December 2022 and was negative, high risk HPV negative.  She denies any vulvar symptoms at this time.  She denies itching, irritation, etc.  Findings: See below  Anesthesia: None  Pathology: None  Estimated blood loss: None    Procedure in detail: The patient was counseled extensively about her previous biopsy results.  She was counseled about the indications for the Colposcopy.  The risks, benefits, and alternatives were discussed with the patient, and she provided verbal consent proceed.  The patient was placed in the dorsal lithotomy position in stirrups.  A dilute solution of 3% acetic acid mixed with normal saline was applied liberally over the vaginal introitus, labia minora, and labia majora bilaterally.  This was allowed to sit for about 5 minutes.  Gross inspection of the vagina and vulva was revealed about a 1 cm area of hypopigmentation with irregular borders at about the 11 o'clock position just superior and right lateral to the clitoris.  The vulva was then thoroughly evaluated with the colposcope under white light and green light.  There were acetowhite changes noted at the area of concern at 11:00.  Some mild increased vascularization noted on the greenlight as well.  Lesion does not cross the midline.  No other abnormal areas were noted on colposcopic evaluation.  A 3 mm punch  biopsy was performed at the area of concern.  1% lidocaine with epinephrine was injected in this area.  The area was swabbed with Betadine x3.  3 mm punch biopsy was used.  Disc was elevated and excised and sent to pathology.  Silver nitrate was applied and pressure was held.  There was excellent hemostasis.  Patient tolerated this procedure well all her questions are answered today.  The patient was discharged home in stable condition.

## 2023-06-20 LAB
DX ICD CODE: NORMAL
PATH REPORT.COMMENTS IMP SPEC: NORMAL
PATH REPORT.FINAL DX SPEC: NORMAL
PATH REPORT.GROSS SPEC: NORMAL
PATH REPORT.SITE OF ORIGIN SPEC: NORMAL
PATHOLOGIST NAME: NORMAL
PAYMENT PROCEDURE: NORMAL

## 2024-06-17 NOTE — PROGRESS NOTES
"Chief Complaint  Annual Exam (Pap- 2022, negative, Mammo- 8/2023 Colonoscopy- never )    Subjective        Jayda Miles presents to [facility]  History of Present Illness  Patient is here for annual exam.  She is without major physical complaints today.  She says that she has been down some lately because her mother passed away about 6 months ago and this has been hard.  She has otherwise been doing well.  She denies vaginal bleeding.  She denies any problems with vulvovaginal discomfort or itching.  Patient had a mammogram last performed in August 2023.  Last mammogram was in 2022 and was normal.  She has never had a colonoscopy.    Patient has a previous history of vulvar dysplasia (HAKEEM 2) diagnosed in 2018 and treated with local excision.  She then underwent imiquimod treatment in 2019.  Subsequent evaluation has been negative.  She underwent vulvar colposcopy last year with negative biopsies.    The following portions of the patient's history were reviewed and updated as appropriate: allergies, current medications, past family history, past medical history, past social history, past surgical history, and problem list.    Objective   Vital Signs:  Wt 72.2 kg (159 lb 3.2 oz)   BMI 28.20 kg/m²   Estimated body mass index is 28.2 kg/m² as calculated from the following:    Height as of 6/13/23: 160 cm (63\").    Weight as of this encounter: 72.2 kg (159 lb 3.2 oz).             Physical Exam   Exam performed in the presence of a female chaperone  Patient has provided verbal consent to proceed with exam.    Gen: No acute distress, awake and oriented times three  HENT: Normocephalic, atraumatic, Moist mucous membranes  Eyes: PERRLA, EOMI  Lungs: Normal work of breathing, lungs clear bilaterally  Breast: Symmetrical. No skin changes or nipple retractions. No lumps or masses bilaterally. No tenderness bilaterally.  Abdomen: soft, nontender, no masses or hernia, non distended, normoactive bowel " sounds  Normal external female genitalia  1 cm area of hypopigmentation with irregular borders at about the 11 o'clock position just superior and right lateral to the clitoris   This is exactly the same as it was noted 1 year ago.  It was biopsied at that time and just showed postinflammatory pigmentation alterations.  There has been no interval change, and I do not feel that any further biopsy of this is necessary.  No other lesions identified.  Urethra: Normal meatus, no caruncle  Bladder: nontender  Vagina: No blood or discharge  Explained limited benefit of routine pelvic exams at the annual visit.  Patient not desiring STD testing at this time and is not due for Pap smear.  As such, pelvic exam deferred today as part of routine annual.  External anal exam: Normal appearance, no lesions or hemorrhoids  Rectal: Deferred  Skin: Warm and dry, no rashes  Psych: Good judgement and insight, normal affect and mood  Neuro: CN 2-12 intact, no gross deficits      Result Review :            Vulvar biopsy (6/2023):  Diagnosis:  RIGHT VULVAR BIOPSY 11:00:  POSTINFLAMMATORY PIGMENTARY ALTERATION.  DOK  06/20/2023  1511 Local  -   . Comment:   Comment: Sections show rare melanophages. p16 is negative. There is no histologic  evidence of lichen sclerosus or dysplasia.       Pap (12/2022):          Component  Ref Range & Units 1 yr ago  (12/12/22) 5 yr ago  (9/25/18) 6 yr ago  (3/16/18) 6 yr ago  (12/8/17) 6 yr ago  (11/30/17)   Diagnosis Comment    Comment CM   Comment: NEGATIVE FOR INTRAEPITHELIAL LESION OR MALIGNANCY.  PREDOMINANCE OF COCCOBACILLI CONSISTENT WITH SHIFT IN VAGINAL DENNY IS  PRESENT.        HPV Aptima  Negative Negative     MMG (2/2022):  IMPRESSION:   There is no mammographic evidence of malignancy.     Screening Mammogram in 1 year is recommended.     BI-RADS Category 1:   Negative            Assessment and Plan     Diagnoses and all orders for this visit:    1. Encounter for gynecological examination with  abnormal finding (Primary)    Pap -up-to-date, not indicated this year  Breast cancer screening. Patient encouraged to perform routine self breast exams. Recommend yearly clinical breast exam and mammogram.  Mammogram is ordered for 8/2024.  Patient has never had a colonoscopy or other form of colon cancer screening.  Colon cancer screening is recommended.  Patient is very nervous about colonoscopy.  We discussed Cologuard as an alternative.  She is interested in pursuing this.  Cologuard ordered and instructions given.  Recommend pt take calcium and vitamin D supplementation.  Encourage aerobic exercise with at least 30 minutes 5 days/wk.  Avoid excessive alcohol use.  Patient is advised to call the office for results after 1 week if she has not seen or heard the results of any tests ordered or performed today.    2. History of vulvar dysplasia    Patient is now greater than 5 years out from her diagnosis of HAKEEM 2.  Most recent biopsies 1 year ago were negative.  Exam today is unremarkable.  We discussed the need of long-term follow-up.  She will need yearly vulvar exams.    3. Encounter for screening mammogram for malignant neoplasm of breast  -     Mammo Screening Digital Tomosynthesis Bilateral With CAD; Future    4. Screen for colon cancer  -     Cologuard - Stool, Per Rectum; Future             Follow Up     Return in about 1 year (around 6/18/2025).  Patient was given instructions and counseling regarding her condition or for health maintenance advice. Please see specific information pulled into the AVS if appropriate.

## 2024-06-18 ENCOUNTER — OFFICE VISIT (OUTPATIENT)
Dept: OBSTETRICS AND GYNECOLOGY | Facility: CLINIC | Age: 59
End: 2024-06-18
Payer: COMMERCIAL

## 2024-06-18 VITALS — WEIGHT: 159.2 LBS | BODY MASS INDEX: 28.2 KG/M2

## 2024-06-18 DIAGNOSIS — Z87.412 HISTORY OF VULVAR DYSPLASIA: ICD-10-CM

## 2024-06-18 DIAGNOSIS — Z01.411 ENCOUNTER FOR GYNECOLOGICAL EXAMINATION WITH ABNORMAL FINDING: Primary | ICD-10-CM

## 2024-06-18 DIAGNOSIS — Z12.31 ENCOUNTER FOR SCREENING MAMMOGRAM FOR MALIGNANT NEOPLASM OF BREAST: ICD-10-CM

## 2024-06-18 DIAGNOSIS — Z12.11 SCREEN FOR COLON CANCER: ICD-10-CM

## 2024-06-18 PROCEDURE — 1160F RVW MEDS BY RX/DR IN RCRD: CPT | Performed by: OBSTETRICS & GYNECOLOGY

## 2024-06-18 PROCEDURE — 99396 PREV VISIT EST AGE 40-64: CPT | Performed by: OBSTETRICS & GYNECOLOGY

## 2024-06-18 PROCEDURE — 99213 OFFICE O/P EST LOW 20 MIN: CPT | Performed by: OBSTETRICS & GYNECOLOGY

## 2024-06-18 PROCEDURE — 1159F MED LIST DOCD IN RCRD: CPT | Performed by: OBSTETRICS & GYNECOLOGY

## 2024-08-28 DIAGNOSIS — Z12.11 SCREEN FOR COLON CANCER: Primary | ICD-10-CM

## 2024-08-29 ENCOUNTER — TELEPHONE (OUTPATIENT)
Dept: OBSTETRICS AND GYNECOLOGY | Facility: CLINIC | Age: 59
End: 2024-08-29
Payer: COMMERCIAL

## 2024-08-29 NOTE — TELEPHONE ENCOUNTER
----- Message from Talib Paniagua sent at 8/28/2024  2:28 PM EDT -----  Please let the patient know that her Cologuard test was abnormal.  This does not necessarily mean that there is something cancerous; however, but it does mean that she will need to have colonoscopy for further evaluation for screening for colon cancer.  I placed a referral to GI for colonoscopy

## 2024-10-14 ENCOUNTER — TELEPHONE (OUTPATIENT)
Dept: GASTROENTEROLOGY | Facility: CLINIC | Age: 59
End: 2024-10-14
Payer: COMMERCIAL

## 2024-10-14 NOTE — TELEPHONE ENCOUNTER
Hub staff attempted to follow warm transfer process and was unsuccessful     Caller: Jayda Miles    Relationship to patient: Self    Best call back number: [phone]; OK TO M    Patient is needing: PATIENT CALLED TO SCHEDULE HER COLONOSCOPY NOW THAT SHE HAS TRANSPORTATION. SHE IS HOPING TO SCHEDULE THE LAST WEEK OF DECEMBER. PLEASE CONTACT PATIENT FOR SCHEDULING.

## 2025-08-22 ENCOUNTER — OFFICE VISIT (OUTPATIENT)
Dept: OBSTETRICS AND GYNECOLOGY | Facility: CLINIC | Age: 60
End: 2025-08-22
Payer: COMMERCIAL

## 2025-08-22 VITALS
SYSTOLIC BLOOD PRESSURE: 111 MMHG | WEIGHT: 156.4 LBS | HEIGHT: 63 IN | DIASTOLIC BLOOD PRESSURE: 73 MMHG | HEART RATE: 83 BPM | BODY MASS INDEX: 27.71 KG/M2

## 2025-08-22 DIAGNOSIS — Z11.3 SCREEN FOR SEXUALLY TRANSMITTED DISEASES: ICD-10-CM

## 2025-08-22 DIAGNOSIS — K63.5 HYPERPLASTIC COLONIC POLYP, UNSPECIFIED PART OF COLON: ICD-10-CM

## 2025-08-22 DIAGNOSIS — Z12.4 SCREENING FOR MALIGNANT NEOPLASM OF CERVIX: ICD-10-CM

## 2025-08-22 DIAGNOSIS — Z12.31 ENCOUNTER FOR SCREENING MAMMOGRAM FOR MALIGNANT NEOPLASM OF BREAST: ICD-10-CM

## 2025-08-22 DIAGNOSIS — Z01.411 ENCOUNTER FOR GYNECOLOGICAL EXAMINATION WITH ABNORMAL FINDING: Primary | ICD-10-CM

## 2025-08-22 DIAGNOSIS — Z87.412 HISTORY OF VULVAR DYSPLASIA: ICD-10-CM

## 2025-08-26 ENCOUNTER — RESULTS FOLLOW-UP (OUTPATIENT)
Dept: OBSTETRICS AND GYNECOLOGY | Facility: CLINIC | Age: 60
End: 2025-08-26
Payer: COMMERCIAL

## 2025-08-26 LAB
C TRACH RRNA CVX QL NAA+PROBE: NEGATIVE
CYTOLOGIST CVX/VAG CYTO: NORMAL
CYTOLOGY CVX/VAG DOC CYTO: NORMAL
CYTOLOGY CVX/VAG DOC THIN PREP: NORMAL
DX ICD CODE: NORMAL
HPV GENOTYPE REFLEX: NORMAL
HPV I/H RISK 4 DNA CVX QL PROBE+SIG AMP: NEGATIVE
N GONORRHOEA RRNA CVX QL NAA+PROBE: NEGATIVE
OTHER STN SPEC: NORMAL
SERVICE CMNT-IMP: NORMAL
STAT OF ADQ CVX/VAG CYTO-IMP: NORMAL
T VAGINALIS RRNA SPEC QL NAA+PROBE: NEGATIVE